# Patient Record
Sex: FEMALE | Race: BLACK OR AFRICAN AMERICAN | NOT HISPANIC OR LATINO
[De-identification: names, ages, dates, MRNs, and addresses within clinical notes are randomized per-mention and may not be internally consistent; named-entity substitution may affect disease eponyms.]

---

## 2017-08-16 ENCOUNTER — APPOINTMENT (OUTPATIENT)
Dept: ENDOCRINOLOGY | Facility: CLINIC | Age: 57
End: 2017-08-16
Payer: COMMERCIAL

## 2017-08-16 VITALS
DIASTOLIC BLOOD PRESSURE: 90 MMHG | HEIGHT: 63 IN | WEIGHT: 186 LBS | OXYGEN SATURATION: 98 % | HEART RATE: 100 BPM | SYSTOLIC BLOOD PRESSURE: 140 MMHG | BODY MASS INDEX: 32.96 KG/M2

## 2017-08-16 DIAGNOSIS — R42 DIZZINESS AND GIDDINESS: ICD-10-CM

## 2017-08-16 DIAGNOSIS — K21.9 GASTRO-ESOPHAGEAL REFLUX DISEASE W/OUT ESOPHAGITIS: ICD-10-CM

## 2017-08-16 DIAGNOSIS — Z84.89 FAMILY HISTORY OF OTHER SPECIFIED CONDITIONS: ICD-10-CM

## 2017-08-16 DIAGNOSIS — K58.9 IRRITABLE BOWEL SYNDROME W/OUT DIARRHEA: ICD-10-CM

## 2017-08-16 DIAGNOSIS — Z83.3 FAMILY HISTORY OF DIABETES MELLITUS: ICD-10-CM

## 2017-08-16 DIAGNOSIS — F40.240 CLAUSTROPHOBIA: ICD-10-CM

## 2017-08-16 DIAGNOSIS — E78.5 HYPERLIPIDEMIA, UNSPECIFIED: ICD-10-CM

## 2017-08-16 DIAGNOSIS — I34.1 NONRHEUMATIC MITRAL (VALVE) PROLAPSE: ICD-10-CM

## 2017-08-16 DIAGNOSIS — Z82.49 FAMILY HISTORY OF ISCHEMIC HEART DISEASE AND OTHER DISEASES OF THE CIRCULATORY SYSTEM: ICD-10-CM

## 2017-08-16 DIAGNOSIS — N28.9 DISORDER OF KIDNEY AND URETER, UNSPECIFIED: ICD-10-CM

## 2017-08-16 DIAGNOSIS — Z80.52 FAMILY HISTORY OF MALIGNANT NEOPLASM OF BLADDER: ICD-10-CM

## 2017-08-16 PROCEDURE — 99205 OFFICE O/P NEW HI 60 MIN: CPT | Mod: 25

## 2017-08-16 PROCEDURE — 36415 COLL VENOUS BLD VENIPUNCTURE: CPT

## 2017-08-16 RX ORDER — BLOOD SUGAR DIAGNOSTIC
STRIP MISCELLANEOUS
Qty: 100 | Refills: 0 | Status: ACTIVE | COMMUNITY
Start: 2017-07-27

## 2017-08-16 RX ORDER — METFORMIN HYDROCHLORIDE 500 MG/1
500 TABLET, COATED ORAL
Qty: 180 | Refills: 0 | Status: DISCONTINUED | COMMUNITY
Start: 2017-07-27

## 2017-08-16 RX ORDER — BLOOD-GLUCOSE METER
70 EACH MISCELLANEOUS
Qty: 2 | Refills: 3 | Status: ACTIVE | COMMUNITY
Start: 2017-08-16 | End: 1900-01-01

## 2017-08-16 RX ORDER — ZOLPIDEM TARTRATE 12.5 MG/1
12.5 TABLET, EXTENDED RELEASE ORAL
Qty: 30 | Refills: 0 | Status: ACTIVE | COMMUNITY
Start: 2017-07-12

## 2017-08-16 RX ORDER — BLOOD-GLUCOSE METER
W/DEVICE EACH MISCELLANEOUS
Qty: 1 | Refills: 0 | Status: ACTIVE | COMMUNITY
Start: 2017-07-28

## 2017-08-16 RX ORDER — PEN NEEDLE, DIABETIC 29 G X1/2"
32G X 4 MM NEEDLE, DISPOSABLE MISCELLANEOUS
Qty: 2 | Refills: 4 | Status: ACTIVE | COMMUNITY
Start: 2017-08-16 | End: 1900-01-01

## 2017-08-16 RX ORDER — INSULIN GLARGINE 100 [IU]/ML
100 INJECTION, SOLUTION SUBCUTANEOUS
Qty: 1 | Refills: 2 | Status: ACTIVE | COMMUNITY
Start: 2017-08-16 | End: 1900-01-01

## 2017-08-16 RX ORDER — LANCETS
EACH MISCELLANEOUS
Qty: 100 | Refills: 0 | Status: DISCONTINUED | COMMUNITY
Start: 2017-07-27

## 2017-08-16 RX ORDER — DIAZEPAM 5 MG/1
5 TABLET ORAL
Qty: 2 | Refills: 0 | Status: DISCONTINUED | COMMUNITY
Start: 2017-08-07

## 2017-08-16 RX ORDER — LANSOPRAZOLE 15 MG/1
15 CAPSULE, DELAYED RELEASE ORAL
Refills: 0 | Status: ACTIVE | COMMUNITY
Start: 2017-08-16

## 2017-08-18 ENCOUNTER — RESULT REVIEW (OUTPATIENT)
Age: 57
End: 2017-08-18

## 2017-08-18 LAB
CREAT SPEC-SCNC: 82 MG/DL
GLUCOSE BLDC GLUCOMTR-MCNC: 279
MICROALBUMIN 24H UR DL<=1MG/L-MCNC: 1.3 MG/DL
MICROALBUMIN/CREAT 24H UR-RTO: 16 MG/G
TSH SERPL-ACNC: 2.67 UIU/ML

## 2017-08-22 ENCOUNTER — RESULT REVIEW (OUTPATIENT)
Age: 57
End: 2017-08-22

## 2017-08-22 DIAGNOSIS — E10.9 TYPE 1 DIABETES MELLITUS W/OUT COMPLICATIONS: ICD-10-CM

## 2017-08-22 LAB — GAD65 AB SER-MCNC: 12.3 NMOL/L

## 2017-09-08 ENCOUNTER — APPOINTMENT (OUTPATIENT)
Dept: ENDOCRINOLOGY | Facility: CLINIC | Age: 57
End: 2017-09-08
Payer: COMMERCIAL

## 2017-09-08 VITALS
SYSTOLIC BLOOD PRESSURE: 160 MMHG | HEIGHT: 63 IN | OXYGEN SATURATION: 96 % | HEART RATE: 95 BPM | BODY MASS INDEX: 32.96 KG/M2 | WEIGHT: 186 LBS | DIASTOLIC BLOOD PRESSURE: 100 MMHG

## 2017-09-08 DIAGNOSIS — I10 ESSENTIAL (PRIMARY) HYPERTENSION: ICD-10-CM

## 2017-09-08 LAB — GLUCOSE BLDC GLUCOMTR-MCNC: 230

## 2017-09-08 PROCEDURE — 82962 GLUCOSE BLOOD TEST: CPT

## 2017-09-08 PROCEDURE — 99215 OFFICE O/P EST HI 40 MIN: CPT | Mod: 25

## 2017-09-11 ENCOUNTER — OTHER (OUTPATIENT)
Age: 57
End: 2017-09-11

## 2017-09-11 RX ORDER — INSULIN LISPRO 100 [IU]/ML
100 INJECTION, SOLUTION INTRAVENOUS; SUBCUTANEOUS
Qty: 1 | Refills: 2 | Status: DISCONTINUED | COMMUNITY
Start: 2017-08-16 | End: 2017-09-11

## 2017-09-11 RX ORDER — INSULIN GLULISINE 100 [IU]/ML
100 INJECTION, SOLUTION SUBCUTANEOUS
Qty: 3 | Refills: 0 | Status: ACTIVE | COMMUNITY
Start: 2017-09-11 | End: 1900-01-01

## 2017-09-11 RX ORDER — INSULIN GLULISINE 100 [IU]/ML
100 INJECTION, SOLUTION SUBCUTANEOUS
Qty: 1 | Refills: 0 | Status: ACTIVE | COMMUNITY
Start: 2017-09-11 | End: 1900-01-01

## 2017-09-13 ENCOUNTER — RX RENEWAL (OUTPATIENT)
Age: 57
End: 2017-09-13

## 2017-09-13 RX ORDER — EPINEPHRINE 0.3 MG/.3ML
0.3 INJECTION INTRAMUSCULAR
Qty: 1 | Refills: 0 | Status: ACTIVE | COMMUNITY
Start: 2017-09-13 | End: 1900-01-01

## 2017-12-21 ENCOUNTER — OUTPATIENT (OUTPATIENT)
Dept: OUTPATIENT SERVICES | Facility: HOSPITAL | Age: 57
LOS: 1 days | End: 2017-12-21
Payer: COMMERCIAL

## 2017-12-21 VITALS
TEMPERATURE: 98 F | HEIGHT: 63 IN | DIASTOLIC BLOOD PRESSURE: 80 MMHG | WEIGHT: 156.97 LBS | RESPIRATION RATE: 16 BRPM | SYSTOLIC BLOOD PRESSURE: 140 MMHG | HEART RATE: 69 BPM

## 2017-12-21 DIAGNOSIS — Z91.041 RADIOGRAPHIC DYE ALLERGY STATUS: ICD-10-CM

## 2017-12-21 DIAGNOSIS — D17.9 BENIGN LIPOMATOUS NEOPLASM, UNSPECIFIED: ICD-10-CM

## 2017-12-21 DIAGNOSIS — E10.9 TYPE 1 DIABETES MELLITUS WITHOUT COMPLICATIONS: ICD-10-CM

## 2017-12-21 DIAGNOSIS — N28.89 OTHER SPECIFIED DISORDERS OF KIDNEY AND URETER: ICD-10-CM

## 2017-12-21 DIAGNOSIS — Z98.890 OTHER SPECIFIED POSTPROCEDURAL STATES: Chronic | ICD-10-CM

## 2017-12-21 DIAGNOSIS — I10 ESSENTIAL (PRIMARY) HYPERTENSION: ICD-10-CM

## 2017-12-21 DIAGNOSIS — D17.30 BENIGN LIPOMATOUS NEOPLASM OF SKIN AND SUBCUTANEOUS TISSUE OF UNSPECIFIED SITES: ICD-10-CM

## 2017-12-21 DIAGNOSIS — Z90.13 ACQUIRED ABSENCE OF BILATERAL BREASTS AND NIPPLES: Chronic | ICD-10-CM

## 2017-12-21 DIAGNOSIS — R00.2 PALPITATIONS: ICD-10-CM

## 2017-12-21 LAB
BUN SERPL-MCNC: 18 MG/DL — SIGNIFICANT CHANGE UP (ref 7–23)
CALCIUM SERPL-MCNC: 9.6 MG/DL — SIGNIFICANT CHANGE UP (ref 8.4–10.5)
CHLORIDE SERPL-SCNC: 97 MMOL/L — LOW (ref 98–107)
CO2 SERPL-SCNC: 28 MMOL/L — SIGNIFICANT CHANGE UP (ref 22–31)
CREAT SERPL-MCNC: 0.73 MG/DL — SIGNIFICANT CHANGE UP (ref 0.5–1.3)
GLUCOSE SERPL-MCNC: 227 MG/DL — HIGH (ref 70–99)
HBA1C BLD-MCNC: 9.1 % — HIGH (ref 4–5.6)
HCT VFR BLD CALC: 40.7 % — SIGNIFICANT CHANGE UP (ref 34.5–45)
HGB BLD-MCNC: 13.7 G/DL — SIGNIFICANT CHANGE UP (ref 11.5–15.5)
MCHC RBC-ENTMCNC: 30.6 PG — SIGNIFICANT CHANGE UP (ref 27–34)
MCHC RBC-ENTMCNC: 33.7 % — SIGNIFICANT CHANGE UP (ref 32–36)
MCV RBC AUTO: 90.8 FL — SIGNIFICANT CHANGE UP (ref 80–100)
NRBC # FLD: 0 — SIGNIFICANT CHANGE UP
PLATELET # BLD AUTO: 245 K/UL — SIGNIFICANT CHANGE UP (ref 150–400)
PMV BLD: 10.3 FL — SIGNIFICANT CHANGE UP (ref 7–13)
POTASSIUM SERPL-MCNC: 4.8 MMOL/L — SIGNIFICANT CHANGE UP (ref 3.5–5.3)
POTASSIUM SERPL-SCNC: 4.8 MMOL/L — SIGNIFICANT CHANGE UP (ref 3.5–5.3)
RBC # BLD: 4.48 M/UL — SIGNIFICANT CHANGE UP (ref 3.8–5.2)
RBC # FLD: 12.3 % — SIGNIFICANT CHANGE UP (ref 10.3–14.5)
SODIUM SERPL-SCNC: 139 MMOL/L — SIGNIFICANT CHANGE UP (ref 135–145)
WBC # BLD: 5.98 K/UL — SIGNIFICANT CHANGE UP (ref 3.8–10.5)
WBC # FLD AUTO: 5.98 K/UL — SIGNIFICANT CHANGE UP (ref 3.8–10.5)

## 2017-12-21 PROCEDURE — 93010 ELECTROCARDIOGRAM REPORT: CPT

## 2017-12-21 NOTE — H&P PST ADULT - PROBLEM SELECTOR PLAN 4
Allergic to insulin, undergoing desensitization process. Pending endocrinology evaluation. Instructed patient to made an appt with endocrinologist. OR booking notified of diabetic status.

## 2017-12-21 NOTE — H&P PST ADULT - NSANTHOSAYNRD_GEN_A_CORE
No. GONZALES screening performed.  STOP BANG Legend: 0-2 = LOW Risk; 3-4 = INTERMEDIATE Risk; 5-8 = HIGH Risk

## 2017-12-21 NOTE — H&P PST ADULT - NEGATIVE CARDIOVASCULAR SYMPTOMS
no dyspnea on exertion/no paroxysmal nocturnal dyspnea/no peripheral edema/no orthopnea/no claudication/no chest pain

## 2017-12-21 NOTE — H&P PST ADULT - PROBLEM SELECTOR PLAN 1
Pt is scheduled for excision of lipoma to right arm for 12/29/17. Preop instructions and surgical scrub provided. Pt will take own prevacid on the morning of procedure for GI prophylaxis. Pt is scheduled for excision of lipoma to right arm for 12/29/17. Preop instructions and surgical scrub provided. Pt will take own prevacid on the morning of procedure for GI prophylaxis. Spoke to Lucina surgical coordinator, notified of requested endocrinology and cardiac evaluations. GONZALES precaution, OR booking notified.

## 2017-12-21 NOTE — H&P PST ADULT - NS ABD PE RECTAL EXAM
ASSESSMENT/PLAN:                                                        (E11.65) Type 2 diabetes mellitus with hyperglycemia, without long-term current use of insulin (H)  (primary encounter diagnosis)  Comment: diarrhea may be from metformin.   Plan: FOOT EXAM, glipiZIDE (GLIPIZIDE XL) 5 MG 24 hr         tablet, Hemoglobin A1c        Stop the metformin.   Take higher dose of glipizide instead.  Change to glipizide 5mg once daily.  Hgb A1C in another 3 months.    (R11.0) Nausea  Comment: seems worse since stopping the omeprazole   Plan: omeprazole (PRILOSEC) 20 MG CR capsule        REstart the omeprazole at 20mg daily.     (I10) Hypertension goal BP (blood pressure) < 140/90  Comment: doing OK  Plan: No change in current treatment plan.    patient refused

## 2017-12-21 NOTE — H&P PST ADULT - PMH
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

## 2017-12-21 NOTE — H&P PST ADULT - HISTORY OF PRESENT ILLNESS
57 year old female presents to presurgical testing with diagnosis with  benign lipomatous neoplasm of skin and subcutaneous tissue of unspecified sites 57 year old female presents to presurgical testing with diagnosis with  benign lipomatous neoplasm of skin and subcutaneous tissue of unspecified sites scheduled for excision of lipoma to right arm for 12/29/17. Pt reports a longstanding right arm mass, increasing over time, reports occasional discomfort. Pt was diagnosed with renal carcinoma this year, found a left renal lesion on imaging, planning surgery on 2/1/18. Surgeon would like to proceed with lipoma removal first prior to nephrectomy.

## 2017-12-21 NOTE — H&P PST ADULT - PSYCHIATRIC COMMENTS
due to current health status due to current health status, reports she has a Hx of vasovagal response with blood work

## 2017-12-21 NOTE — H&P PST ADULT - PROBLEM SELECTOR PLAN 2
Pending cardiac evaluation. Pt was evaluated by cardiologist yesterday, scheduled for echo and stress test on 1/11/18.

## 2017-12-21 NOTE — H&P PST ADULT - OPH GEN HX ROS MEA POS PC
due to high blood sugar occasionally/blurred vision L/blurred vision R blurred vision L/blurred vision R/due to high blood sugar

## 2017-12-21 NOTE — H&P PST ADULT - NEGATIVE ENMT SYMPTOMS
no ear pain/no vertigo/no sinus symptoms/no hearing difficulty/no tinnitus/no nose bleeds/no throat pain/no dysphagia

## 2017-12-21 NOTE — H&P PST ADULT - FAMILY HISTORY
Mother  Still living? No  Family history of dementia, Age at diagnosis: Age Unknown     Father  Still living? No  Family history of heart disease, Age at diagnosis: Age Unknown

## 2017-12-21 NOTE — H&P PST ADULT - MUSCULOSKELETAL
detailed exam no joint swelling/no joint erythema/no joint warmth/no calf tenderness/normal strength/ROM intact details…

## 2017-12-21 NOTE — H&P PST ADULT - NEGATIVE OPHTHALMOLOGIC SYMPTOMS
no discharge R/no pain L/no diplopia/no photophobia/no discharge L/no pain R/no loss of vision R/no loss of vision L

## 2017-12-21 NOTE — H&P PST ADULT - PSH
H/O craniotomy  at 18 months old, had benign tumor  History of lumpectomy of both breasts  benign  S/P left knee arthroscopy  x2

## 2017-12-21 NOTE — H&P PST ADULT - NEGATIVE NEUROLOGICAL SYMPTOMS
no paresthesias/no vertigo/no difficulty walking/no confusion/no focal seizures/no tremors/no hemiparesis/no transient paralysis/no weakness/no generalized seizures/no loss of sensation/no headache/no syncope/no loss of consciousness

## 2017-12-21 NOTE — H&P PST ADULT - RS GEN PE MLT RESP DETAILS PC
no rhonchi/no wheezes/no intercostal retractions/no rales/respirations non-labored/airway patent/breath sounds equal/no chest wall tenderness/no subcutaneous emphysema/clear to auscultation bilaterally/good air movement

## 2017-12-21 NOTE — H&P PST ADULT - BACK EXAM
ROM intact/strength intact/normal shape full ROM of neck, no CVA tenderness/normal shape/ROM intact/strength intact

## 2017-12-21 NOTE — H&P PST ADULT - NEGATIVE GENERAL GENITOURINARY SYMPTOMS
no flank pain L/no dysuria/no flank pain R/no renal colic/no bladder infections/no nocturia/no incontinence

## 2017-12-21 NOTE — H&P PST ADULT - NEGATIVE GENERAL SYMPTOMS
no polydipsia/no weight loss/no fever/no chills/no sweating/no anorexia/no fatigue/no weight gain/no polyphagia/no polyuria/no malaise

## 2018-07-16 PROBLEM — D17.9 BENIGN LIPOMATOUS NEOPLASM, UNSPECIFIED: Chronic | Status: ACTIVE | Noted: 2017-12-21

## 2018-07-16 PROBLEM — I10 ESSENTIAL (PRIMARY) HYPERTENSION: Chronic | Status: ACTIVE | Noted: 2017-12-21

## 2018-08-01 ENCOUNTER — APPOINTMENT (OUTPATIENT)
Dept: CHRONIC DISEASE MANAGEMENT | Facility: CLINIC | Age: 58
End: 2018-08-01

## 2018-08-10 PROBLEM — Z91.041 RADIOGRAPHIC DYE ALLERGY STATUS: Chronic | Status: ACTIVE | Noted: 2017-12-21

## 2018-08-10 PROBLEM — K21.9 GASTRO-ESOPHAGEAL REFLUX DISEASE WITHOUT ESOPHAGITIS: Chronic | Status: ACTIVE | Noted: 2017-12-21

## 2018-08-10 PROBLEM — N28.89 OTHER SPECIFIED DISORDERS OF KIDNEY AND URETER: Chronic | Status: ACTIVE | Noted: 2017-12-21

## 2018-08-10 PROBLEM — K58.9 IRRITABLE BOWEL SYNDROME WITHOUT DIARRHEA: Chronic | Status: ACTIVE | Noted: 2017-12-21

## 2018-08-10 PROBLEM — E10.9 TYPE 1 DIABETES MELLITUS WITHOUT COMPLICATIONS: Chronic | Status: ACTIVE | Noted: 2017-12-21

## 2018-08-10 PROBLEM — I34.1 NONRHEUMATIC MITRAL (VALVE) PROLAPSE: Chronic | Status: ACTIVE | Noted: 2017-12-21

## 2018-08-10 PROBLEM — L21.9 SEBORRHEIC DERMATITIS, UNSPECIFIED: Chronic | Status: ACTIVE | Noted: 2017-12-21

## 2018-08-10 PROBLEM — R00.2 PALPITATIONS: Chronic | Status: ACTIVE | Noted: 2017-12-21

## 2018-08-16 ENCOUNTER — APPOINTMENT (OUTPATIENT)
Dept: GASTROENTEROLOGY | Facility: CLINIC | Age: 58
End: 2018-08-16

## 2019-05-08 ENCOUNTER — APPOINTMENT (OUTPATIENT)
Dept: ORTHOPEDIC SURGERY | Facility: CLINIC | Age: 59
End: 2019-05-08

## 2019-06-05 ENCOUNTER — APPOINTMENT (OUTPATIENT)
Dept: ORTHOPEDIC SURGERY | Facility: CLINIC | Age: 59
End: 2019-06-05
Payer: COMMERCIAL

## 2019-06-05 VITALS
SYSTOLIC BLOOD PRESSURE: 176 MMHG | BODY MASS INDEX: 31.71 KG/M2 | WEIGHT: 179 LBS | DIASTOLIC BLOOD PRESSURE: 100 MMHG | HEART RATE: 102 BPM | HEIGHT: 63 IN

## 2019-06-05 DIAGNOSIS — M54.16 RADICULOPATHY, LUMBAR REGION: ICD-10-CM

## 2019-06-05 PROCEDURE — 72110 X-RAY EXAM L-2 SPINE 4/>VWS: CPT

## 2019-06-05 PROCEDURE — 99204 OFFICE O/P NEW MOD 45 MIN: CPT

## 2019-06-05 PROCEDURE — 72170 X-RAY EXAM OF PELVIS: CPT | Mod: 59

## 2019-06-05 NOTE — REASON FOR VISIT
[Initial Visit] : an initial visit for [Back Pain] : back pain [FreeTextEntry2] : left lower back pain

## 2019-06-05 NOTE — HISTORY OF PRESENT ILLNESS
[Pain Location] : pain [8] : a maximum pain level of 8/10 [7] : a minimum pain level of 7/10 [Constant] : ~He/She~ states the symptoms seem to be constant [Sitting] : worsened by sitting [Heat] : relieved by heat [Ice] : relieved by ice [Physical Therapy] : relieved by physical therapy [de-identified] : Patient is a pleasant 58-year-old female presents with a history of pain on the left side of the low back and neck and left hip area. This started January 2019. Patient saw her primary care physician who sent her for physical therapy which resolved the pain in the lower back. Patient then started experiencing upper back pain and is referred here by a physical therapist.The patient has a history of renal cell carcinoma and portal diabetes. Patient never had treatment for the renal cell diagnosis which was made 2 years ago. [Recumbency] : not relieved by recumbency

## 2019-06-05 NOTE — DISCUSSION/SUMMARY
[de-identified] : We'll proceed with an MRI of the lumbar spine. The patient will follow up with us in one to 2 weeks to review those results and determine a long-term plan. Patient also is going to seek help concerning her renal cell carcinoma from someone at [facility].

## 2019-06-05 NOTE — PHYSICAL EXAM
[LE] : Sensory: Intact in bilateral lower extremities [1+] : left ankle jerk 1+ [Normal RLE] : Right Lower Extremity: No scars, rashes, lesions, ulcers, skin intact [Normal LLE] : Left Lower Extremity: No scars, rashes, lesions, ulcers, skin intact [Obese] : obese [Normal] : Oriented to person, place, and time, insight and judgement were intact and the affect was normal [DTR Reflexes Clonus Of Right Ankle (___ Beats)] : absent on the right [DTR Reflexes Clonus Of Left Ankle (___ Beats)] : absent on the left [de-identified] : AP lateral and flexion-extension x-rays obtained in the lumbar spine show mild facet arthropathy at multiple levels. His negative for obvious fracture dislocation instability or bony lesions.\par \par AP the pelvis is negative for significant bilateral hip pathology.

## 2019-10-31 ENCOUNTER — APPOINTMENT (OUTPATIENT)
Dept: SPINE | Facility: CLINIC | Age: 59
End: 2019-10-31
Payer: COMMERCIAL

## 2019-10-31 VITALS
SYSTOLIC BLOOD PRESSURE: 158 MMHG | HEIGHT: 63 IN | WEIGHT: 163 LBS | DIASTOLIC BLOOD PRESSURE: 94 MMHG | BODY MASS INDEX: 28.88 KG/M2 | HEART RATE: 90 BPM

## 2019-10-31 DIAGNOSIS — M54.16 RADICULOPATHY, LUMBAR REGION: ICD-10-CM

## 2019-10-31 PROCEDURE — 99203 OFFICE O/P NEW LOW 30 MIN: CPT

## 2019-10-31 RX ORDER — PREDNISONE 50 MG/1
50 TABLET ORAL
Qty: 3 | Refills: 0 | Status: DISCONTINUED | COMMUNITY
Start: 2017-08-07 | End: 2019-10-31

## 2019-10-31 RX ORDER — ATORVASTATIN CALCIUM 20 MG/1
20 TABLET, FILM COATED ORAL
Qty: 30 | Refills: 3 | Status: DISCONTINUED | COMMUNITY
Start: 2017-09-08 | End: 2019-10-31

## 2019-10-31 RX ORDER — AMLODIPINE BESYLATE 5 MG/1
5 TABLET ORAL DAILY
Qty: 30 | Refills: 0 | Status: DISCONTINUED | COMMUNITY
Start: 2017-09-08 | End: 2019-10-31

## 2019-10-31 RX ORDER — PIMECROLIMUS 10 MG/G
1 CREAM TOPICAL
Refills: 0 | Status: DISCONTINUED | COMMUNITY
Start: 2017-08-16 | End: 2019-10-31

## 2019-10-31 RX ORDER — URINE ACETONE TEST STRIPS
STRIP MISCELLANEOUS
Qty: 1 | Refills: 3 | Status: DISCONTINUED | COMMUNITY
Start: 2017-09-08 | End: 2019-10-31

## 2019-11-01 RX ORDER — METOPROLOL SUCCINATE 25 MG/1
25 TABLET, EXTENDED RELEASE ORAL
Refills: 0 | Status: ACTIVE | COMMUNITY

## 2019-11-01 RX ORDER — NYSTATIN 100000 [USP'U]/G
POWDER TOPICAL
Refills: 0 | Status: ACTIVE | COMMUNITY

## 2019-11-01 NOTE — ASSESSMENT
[FreeTextEntry1] : 59 Year old female who has sciatica like pain with numbness of the right leg after undergoing a cardiac valve replacement.  She has significant pain and a new updated MRI of the lumbar spine was ordered. She will also see a neurologist for an EMG .  She will return when these images/diagnostics are available.

## 2019-11-01 NOTE — CONSULT LETTER
[Dear  ___] : Dear  [unfilled], [Consult Letter:] : I had the pleasure of evaluating your patient, [unfilled]. [Please see my note below.] : Please see my note below. [Consult Closing:] : Thank you very much for allowing me to participate in the care of this patient.  If you have any questions, please do not hesitate to contact me. [Sincerely,] : Sincerely, [FreeTextEntry3] : Adan Arndt MD\par Chief of Neurosurgery [facility]\par [facility]\par

## 2019-11-01 NOTE — REASON FOR VISIT
[New Patient Visit] : a new patient visit [Family Member] : family member [Referred By: _________] : Patient was referred by DORA [FreeTextEntry1] : Right sided sciatica pain

## 2019-11-01 NOTE — HISTORY OF PRESENT ILLNESS
[de-identified] : 59 year old female who underwent cardiac valve surgery and has right sided sciatica like pain.  She has no other symptoms. 6 weeks ago after emergency cardiac valve surgery the patient awoke with numbness in the right leg. She is also had previous low back pain with left-sided sciatica. An MRI scan done prior to his cardiac surgery did not show any disc herniation or stenosis. She is describing severe pain in the left buttock area and numbness and burning in the right leg. She was told by a neurologist after the cardiac surgery that she had a femoral neuropathy. She does not describe any weakness or incontinence.

## 2019-11-08 ENCOUNTER — OTHER (OUTPATIENT)
Age: 59
End: 2019-11-08

## 2019-11-19 ENCOUNTER — OTHER (OUTPATIENT)
Age: 59
End: 2019-11-19

## 2019-12-19 ENCOUNTER — APPOINTMENT (OUTPATIENT)
Dept: SPINE | Facility: CLINIC | Age: 59
End: 2019-12-19

## 2020-01-30 ENCOUNTER — APPOINTMENT (OUTPATIENT)
Dept: GASTROENTEROLOGY | Facility: CLINIC | Age: 60
End: 2020-01-30

## 2021-02-16 ENCOUNTER — APPOINTMENT (OUTPATIENT)
Dept: GASTROENTEROLOGY | Facility: CLINIC | Age: 61
End: 2021-02-16

## 2021-04-26 ENCOUNTER — APPOINTMENT (OUTPATIENT)
Dept: ORTHOPEDIC SURGERY | Facility: CLINIC | Age: 61
End: 2021-04-26

## 2023-04-10 ENCOUNTER — APPOINTMENT (OUTPATIENT)
Dept: ORTHOPEDIC SURGERY | Facility: CLINIC | Age: 63
End: 2023-04-10
Payer: COMMERCIAL

## 2023-04-10 ENCOUNTER — NON-APPOINTMENT (OUTPATIENT)
Age: 63
End: 2023-04-10

## 2023-04-10 VITALS
WEIGHT: 163 LBS | HEART RATE: 90 BPM | SYSTOLIC BLOOD PRESSURE: 132 MMHG | DIASTOLIC BLOOD PRESSURE: 74 MMHG | HEIGHT: 63 IN | BODY MASS INDEX: 28.88 KG/M2

## 2023-04-10 DIAGNOSIS — M51.36 OTHER INTERVERTEBRAL DISC DEGENERATION, LUMBAR REGION: ICD-10-CM

## 2023-04-10 PROCEDURE — 72110 X-RAY EXAM L-2 SPINE 4/>VWS: CPT

## 2023-04-10 PROCEDURE — 99204 OFFICE O/P NEW MOD 45 MIN: CPT

## 2023-04-10 NOTE — PHYSICAL EXAM
[0] : left ankle jerk 0 [Antalgic] : antalgic [Wide-Based] : wide-based [LE] : 5/5 motor strength in bilateral lower extremities [] : Sensory: [L3-Bilat] : L3 [L4-Bilat] : L4 [L5-Bilat] : L5 [S1-Bilat] : S1 [Normal RLE] : Right Lower Extremity: No scars, rashes, lesions, ulcers, skin intact [Normal LLE] : Left Lower Extremity: No scars, rashes, lesions, ulcers, skin intact [Obese] : obese [Normal] : Oriented to person, place, and time, insight and judgement were intact and the affect was normal [de-identified] : Limited LROM due to pain [de-identified] : AP Lateral Flexion Extension L spine X-Rays performed on 4/10/23 shows Mild facet arthritis. No Instability, Tumors, fractures, or bone elisions noted. \par

## 2023-04-10 NOTE — DISCUSSION/SUMMARY
[2 Weeks] : in 2 weeks [de-identified] : I expressed to the patient that her symptoms may be related to spinal stenosis or peripheral neuropathy at this time. I informed the patient in order to receive further evaluation, it would be best to obtain an updated MRI of the Lumbar Spine. I provided the patient with a script to obtain an MRI and the patient should follow-up in 1-2 weeks to review the results of the MRI.  If the MRI does not reveal the etiology of her lower extremity dysesthesias, she should at that time return to her neurologist.\par \par

## 2023-04-10 NOTE — HISTORY OF PRESENT ILLNESS
[___ wks] : [unfilled] week(s) ago [6] : a current pain level of 6/10 [Intermit.] : ~He/She~ states the symptoms seem to be intermittent [de-identified] : A 62 year old female presents an initial visit for lower back pain & left sided sciatica pain ongoing for 6 weeks to no known injury. The patient states that she currently has symptoms of numbness and tingling with the inability to walk or stand without assistance, along with buckling of her knees bilaterally. The patient current takes no medication to aid her current symptoms. The patient reports that she has been attending pain management and was recommended to do an ablation at this time. \par

## 2023-04-10 NOTE — ADDENDUM
[FreeTextEntry1] : I, Serg Andres Jr, documented this note as a scribe on the behalf of Dr. Rubio Maurice MD.

## 2023-04-24 ENCOUNTER — APPOINTMENT (OUTPATIENT)
Dept: PODIATRY | Facility: CLINIC | Age: 63
End: 2023-04-24

## 2023-07-11 ENCOUNTER — APPOINTMENT (OUTPATIENT)
Dept: GASTROENTEROLOGY | Facility: CLINIC | Age: 63
End: 2023-07-11

## 2024-07-30 ENCOUNTER — APPOINTMENT (OUTPATIENT)
Dept: GASTROENTEROLOGY | Facility: CLINIC | Age: 64
End: 2024-07-30